# Patient Record
Sex: FEMALE | Race: WHITE | NOT HISPANIC OR LATINO
[De-identification: names, ages, dates, MRNs, and addresses within clinical notes are randomized per-mention and may not be internally consistent; named-entity substitution may affect disease eponyms.]

---

## 2024-08-13 ENCOUNTER — TRANSCRIBE ORDERS (OUTPATIENT)
Dept: OTHER | Age: 74
End: 2024-08-13

## 2024-08-13 DIAGNOSIS — Z48.810 ENCOUNTER FOR SURGICAL AFTERCARE FOLLOWING SURGERY ON THE SENSE ORGANS: Primary | ICD-10-CM

## 2024-10-06 ENCOUNTER — HEALTH MAINTENANCE LETTER (OUTPATIENT)
Age: 74
End: 2024-10-06

## 2024-11-13 ENCOUNTER — OFFICE VISIT (OUTPATIENT)
Dept: OPHTHALMOLOGY | Facility: CLINIC | Age: 74
End: 2024-11-13
Payer: COMMERCIAL

## 2024-11-13 DIAGNOSIS — H04.553 ACQUIRED OBSTRUCTION OF BOTH NASOLACRIMAL DUCTS: Primary | ICD-10-CM

## 2024-11-13 RX ORDER — WARFARIN SODIUM 1 MG/1
TABLET ORAL
COMMUNITY
Start: 2024-09-30

## 2024-11-13 RX ORDER — FLECAINIDE ACETATE 100 MG/1
100 TABLET ORAL 2 TIMES DAILY
COMMUNITY
Start: 2024-08-24 | End: 2025-08-24

## 2024-11-13 RX ORDER — MULTIVITAMIN,THERAPEUTIC
1 TABLET ORAL DAILY
COMMUNITY

## 2024-11-13 ASSESSMENT — VISUAL ACUITY
METHOD: SNELLEN - LINEAR
OS_CC: 20/20
CORRECTION_TYPE: GLASSES
OD_CC: 20/25
OD_CC+: -1

## 2024-11-13 ASSESSMENT — CONF VISUAL FIELD
OS_NORMAL: 1
OD_SUPERIOR_NASAL_RESTRICTION: 0
OD_INFERIOR_TEMPORAL_RESTRICTION: 0
OD_SUPERIOR_TEMPORAL_RESTRICTION: 0
METHOD: COUNTING FINGERS
OS_INFERIOR_TEMPORAL_RESTRICTION: 0
OD_INFERIOR_NASAL_RESTRICTION: 0
OS_INFERIOR_NASAL_RESTRICTION: 0
OS_SUPERIOR_NASAL_RESTRICTION: 0
OS_SUPERIOR_TEMPORAL_RESTRICTION: 0
OD_NORMAL: 1

## 2024-11-13 ASSESSMENT — TONOMETRY
OD_IOP_MMHG: 12
OS_IOP_MMHG: 13
IOP_METHOD: ICARE

## 2024-11-13 NOTE — PROGRESS NOTES
"     Oculoplastic Clinic New Patient    Patient: Demetria Luevano MRN# [id number]   YOB: 1950 Age: 74 year old   Date of Visit: Nov 13, 2024    CC: Tearing    Chief Complaint(s) and History of Present Illness(es)     Tearing Evaluation            Laterality: left eye          Comments    Pt comes to the clinic for a tearing eval. She is S/P DCR each eye 8/2024   at [facility] [place]. She is here today to discuss if a revision of the left side would   be appropriate. She states she has continued tearing of the left eye. Runs down the cheek. They pool in the eye and run down her cheek and nose. She states that the eyes feel \"sticky\" and \"crusty\".      Afib, takes warfarin. Does have pacemaker. No previous history of cancer   or trauma.              Irrigation and Nasal Endoscopy:     PROCEDURE: Dilation and irrigation left eye  SURGEON: Seble Carty MD  ANESTHESIA: Topical  COMPLICATIONS: None  EBL: Nil  FINDINGS: n/a% block right eye,  100% block left eye with mucous return from upper punctum with irrigation of lower    The patient was placed supine in the examining chair. Proparacaine was placed in the eye.  The punctum was anesthesized with 2% lidocaine on a cotton tip applicator.  The punctum was dilated with punctal dilator. The 25 gauge lacrimal cannula was placed in the proximal canaliculus and the lacrimal system irrigated with water.  The patient tolerated the procedure well.   I was present for the entire procedure - Seble Carty MD  PROCEDURE: Nasal Endoscopy  SURGEON: Seble Carty MD  ANESTHESIA: Topical  COMPLICATIONS: None  EBL: Nil  FINDINGS: Normal nasal exam for  lacrimal surgery. 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      Indications: Examine the nasal cavity for lacrimal surgery    The zero degree nasal endoscope was passed under endonasally. The inferior turbinates appeared normal. There was  normal " "middle turbinate architecture. No polyps or purulence. No septal perforation. No masses or lesions were identified.     I was present for the entire procedure - Seble Carty MD           Assessment and Plan:     1. Acquired obstruction of both nasolacrimal ducts      Doing well on the right side post Dacryocystorhinostomy (DCR) at [facility] [place] [place] with Dr. Davis 8/2024. Left side never really had relief. She is completely obstructed with mucous return.    Plan:  Left revision Dacryocystorhinostomy (DCR) with mitomycin C. We discussed endoscopic vs external and she prefers to do it from external approach (through the same incision).    Will need INR < 2       Attending Physician Attestation: Complete documentation of historical and exam elements from today's encounter can be found in the full encounter summary report (not reduplicated in this progress note). I personally obtained the chief complaint(s) and history of present illness. I confirmed and edited as necessary the review of systems, past medical/surgical history, family history, social history, and examination findings as documented by others; and I examined the patient myself. I personally reviewed the relevant tests, images, and reports as documented above. I formulated and edited as necessary the assessment and plan and discussed the findings and management plan with the patient. Seble Carty MD      Today with Demetria Luevano I reviewed the indications, risks, benefits, and alternatives of the proposed surgical procedure including, but not limited to, failure obtain the desired result  and need for additional surgery, bleeding, infection, loss of vision, loss of the eye, and the remote possibility of permanent damage to any organ system or death. Dacryocystorhinostomy can fail due to closure of the ostium, or scarring anywhere along the path of outflow. This may necessitate more surgery, and sometimes we cannot \"cure\" tearing. Other rare risks include CSF " leak, and sinus infections. I provided multiple opportunities for the questions, answered all questions to the best of my ability, and confirmed that my answers and my discussion were understood. Seble Carty MD

## 2024-11-13 NOTE — NURSING NOTE
"Chief Complaints and History of Present Illnesses   Patient presents with    Tearing Evaluation     Chief Complaint(s) and History of Present Illness(es)       Tearing Evaluation              Laterality: left eye              Comments    Pt comes to the clinic for a tearing eval. She is S/P DCR each eye 8/2024 at [facility] [place]. She is here today to discuss if a revision of the left side would be appropriate. She states she has continued tearing of the left eye. They pool in the eye and run down her cheek and nose. She states that the eyes feel \"sticky\" and \"crusty\".      Afib, takes warfarin. Does have pacemaker. No previous history of cancer or trauma.                  "

## 2024-11-14 ENCOUNTER — TELEPHONE (OUTPATIENT)
Dept: OPHTHALMOLOGY | Facility: CLINIC | Age: 74
End: 2024-11-14
Payer: COMMERCIAL

## 2024-11-14 NOTE — TELEPHONE ENCOUNTER
Called patient to schedule surgery with Dr. Carty    Spoke with: Mignon    Date(s) of Surgery: 2-17-25    Patient aware of approximate arrival time: No at Pt to get a call the week prior to surgery     [facility]     Pre-Op H&P: [facility] at [facility]     Informed patient that they need to call to schedule pre-op H&P within 30 days of surgery date: Yes      Post-Op Appt Dates: 3-5-25 1:00pm [place]       Discussed with patient pre-op RN will call 2-3 days prior to surgery with arrival time and instructions:  Yes       Standard Surgery Packet Sent: Yes 11/14/24  via Mail - Standard      Additional Information Sent in Packet:          Informed patient that they will need an adult  to bring patient home from surgery: Yes  : Will have family drive         Additional Comments:        All patients questions were answered and was instructed to review surgical packet and call back [phone] with any questions or concerns.       Adalgisa Nunez on 11/14/2024 at 1:00 PM

## 2025-02-07 PROBLEM — H04.553 ACQUIRED OBSTRUCTION OF BOTH NASOLACRIMAL DUCTS: Status: ACTIVE | Noted: 2024-11-13

## 2025-02-17 ENCOUNTER — ANESTHESIA EVENT (OUTPATIENT)
Dept: SURGERY | Facility: AMBULATORY SURGERY CENTER | Age: 75
End: 2025-02-17
Payer: COMMERCIAL

## 2025-02-17 ENCOUNTER — HOSPITAL ENCOUNTER (OUTPATIENT)
Facility: AMBULATORY SURGERY CENTER | Age: 75
Discharge: HOME | End: 2025-02-17
Attending: OPHTHALMOLOGY | Admitting: OPHTHALMOLOGY
Payer: COMMERCIAL

## 2025-02-17 ENCOUNTER — ANESTHESIA (OUTPATIENT)
Dept: SURGERY | Facility: AMBULATORY SURGERY CENTER | Age: 75
End: 2025-02-17
Payer: COMMERCIAL

## 2025-02-17 VITALS
TEMPERATURE: 97 F | SYSTOLIC BLOOD PRESSURE: 168 MMHG | DIASTOLIC BLOOD PRESSURE: 75 MMHG | BODY MASS INDEX: 37.22 KG/M2 | WEIGHT: 218.03 LBS | RESPIRATION RATE: 16 BRPM | OXYGEN SATURATION: 100 % | HEART RATE: 57 BPM | HEIGHT: 64 IN

## 2025-02-17 DIAGNOSIS — H04.553 ACQUIRED OBSTRUCTION OF BOTH NASOLACRIMAL DUCTS: Primary | ICD-10-CM

## 2025-02-17 DIAGNOSIS — H04.552 ACQUIRED OBSTRUCTION OF LEFT NASOLACRIMAL DUCT: ICD-10-CM

## 2025-02-17 LAB — INR PPP: 1.09 (ref 0.85–1.15)

## 2025-02-17 PROCEDURE — 85610 PROTHROMBIN TIME: CPT | Mod: GZ | Performed by: OPHTHALMOLOGY

## 2025-02-17 RX ORDER — POVIDONE-IODINE 5 %
SOLUTION, NON-ORAL OPHTHALMIC (EYE) PRN
Status: DISCONTINUED | OUTPATIENT
Start: 2025-02-17 | End: 2025-02-17 | Stop reason: HOSPADM

## 2025-02-17 RX ORDER — DEXAMETHASONE SODIUM PHOSPHATE 4 MG/ML
4 INJECTION, SOLUTION INTRA-ARTICULAR; INTRALESIONAL; INTRAMUSCULAR; INTRAVENOUS; SOFT TISSUE
Status: DISCONTINUED | OUTPATIENT
Start: 2025-02-17 | End: 2025-02-18 | Stop reason: HOSPADM

## 2025-02-17 RX ORDER — FENTANYL CITRATE 50 UG/ML
50 INJECTION, SOLUTION INTRAMUSCULAR; INTRAVENOUS EVERY 5 MIN PRN
Status: DISCONTINUED | OUTPATIENT
Start: 2025-02-17 | End: 2025-02-18 | Stop reason: HOSPADM

## 2025-02-17 RX ORDER — FENTANYL CITRATE 50 UG/ML
INJECTION, SOLUTION INTRAMUSCULAR; INTRAVENOUS PRN
Status: DISCONTINUED | OUTPATIENT
Start: 2025-02-17 | End: 2025-02-17

## 2025-02-17 RX ORDER — FENTANYL CITRATE 50 UG/ML
25 INJECTION, SOLUTION INTRAMUSCULAR; INTRAVENOUS EVERY 5 MIN PRN
Status: DISCONTINUED | OUTPATIENT
Start: 2025-02-17 | End: 2025-02-18 | Stop reason: HOSPADM

## 2025-02-17 RX ORDER — LIDOCAINE HYDROCHLORIDE 20 MG/ML
INJECTION, SOLUTION INFILTRATION; PERINEURAL PRN
Status: DISCONTINUED | OUTPATIENT
Start: 2025-02-17 | End: 2025-02-17

## 2025-02-17 RX ORDER — ONDANSETRON 4 MG/1
4 TABLET, ORALLY DISINTEGRATING ORAL EVERY 30 MIN PRN
Status: DISCONTINUED | OUTPATIENT
Start: 2025-02-17 | End: 2025-02-18 | Stop reason: HOSPADM

## 2025-02-17 RX ORDER — HYDRALAZINE HYDROCHLORIDE 20 MG/ML
10 INJECTION INTRAMUSCULAR; INTRAVENOUS
Status: COMPLETED | OUTPATIENT
Start: 2025-02-17 | End: 2025-02-17

## 2025-02-17 RX ORDER — SODIUM CHLORIDE, SODIUM LACTATE, POTASSIUM CHLORIDE, CALCIUM CHLORIDE 600; 310; 30; 20 MG/100ML; MG/100ML; MG/100ML; MG/100ML
INJECTION, SOLUTION INTRAVENOUS CONTINUOUS
Status: DISCONTINUED | OUTPATIENT
Start: 2025-02-17 | End: 2025-02-18 | Stop reason: HOSPADM

## 2025-02-17 RX ORDER — ONDANSETRON 2 MG/ML
4 INJECTION INTRAMUSCULAR; INTRAVENOUS EVERY 30 MIN PRN
Status: DISCONTINUED | OUTPATIENT
Start: 2025-02-17 | End: 2025-02-18 | Stop reason: HOSPADM

## 2025-02-17 RX ORDER — TETRACAINE HYDROCHLORIDE 5 MG/ML
SOLUTION OPHTHALMIC PRN
Status: DISCONTINUED | OUTPATIENT
Start: 2025-02-17 | End: 2025-02-17 | Stop reason: HOSPADM

## 2025-02-17 RX ORDER — OXYCODONE HYDROCHLORIDE 5 MG/1
10 TABLET ORAL
Status: DISCONTINUED | OUTPATIENT
Start: 2025-02-17 | End: 2025-02-18 | Stop reason: HOSPADM

## 2025-02-17 RX ORDER — PROPOFOL 10 MG/ML
INJECTION, EMULSION INTRAVENOUS PRN
Status: DISCONTINUED | OUTPATIENT
Start: 2025-02-17 | End: 2025-02-17

## 2025-02-17 RX ORDER — SODIUM CHLORIDE, SODIUM LACTATE, POTASSIUM CHLORIDE, CALCIUM CHLORIDE 600; 310; 30; 20 MG/100ML; MG/100ML; MG/100ML; MG/100ML
INJECTION, SOLUTION INTRAVENOUS CONTINUOUS PRN
Status: DISCONTINUED | OUTPATIENT
Start: 2025-02-17 | End: 2025-02-17

## 2025-02-17 RX ORDER — NALOXONE HYDROCHLORIDE 0.4 MG/ML
0.1 INJECTION, SOLUTION INTRAMUSCULAR; INTRAVENOUS; SUBCUTANEOUS
Status: DISCONTINUED | OUTPATIENT
Start: 2025-02-17 | End: 2025-02-18 | Stop reason: HOSPADM

## 2025-02-17 RX ORDER — ERYTHROMYCIN 5 MG/G
OINTMENT OPHTHALMIC
Qty: 3.5 G | Refills: 0 | Status: SHIPPED | OUTPATIENT
Start: 2025-02-17

## 2025-02-17 RX ORDER — ONDANSETRON 2 MG/ML
INJECTION INTRAMUSCULAR; INTRAVENOUS PRN
Status: DISCONTINUED | OUTPATIENT
Start: 2025-02-17 | End: 2025-02-17

## 2025-02-17 RX ORDER — MITOMYCIN IN WATER/PF 0.2 MG/ML
2 SYRINGE (ML) OPHTHALMIC (EYE) ONCE
Status: COMPLETED | OUTPATIENT
Start: 2025-02-17 | End: 2025-02-17

## 2025-02-17 RX ORDER — ACETAMINOPHEN 325 MG/1
975 TABLET ORAL ONCE
Status: COMPLETED | OUTPATIENT
Start: 2025-02-17 | End: 2025-02-17

## 2025-02-17 RX ORDER — PROPOFOL 10 MG/ML
INJECTION, EMULSION INTRAVENOUS CONTINUOUS PRN
Status: DISCONTINUED | OUTPATIENT
Start: 2025-02-17 | End: 2025-02-17

## 2025-02-17 RX ORDER — DEXAMETHASONE SODIUM PHOSPHATE 4 MG/ML
INJECTION, SOLUTION INTRA-ARTICULAR; INTRALESIONAL; INTRAMUSCULAR; INTRAVENOUS; SOFT TISSUE PRN
Status: DISCONTINUED | OUTPATIENT
Start: 2025-02-17 | End: 2025-02-17

## 2025-02-17 RX ORDER — NEOMYCIN POLYMYXIN B SULFATES AND DEXAMETHASONE 3.5; 10000; 1 MG/ML; [USP'U]/ML; MG/ML
SUSPENSION/ DROPS OPHTHALMIC
Qty: 5 ML | Refills: 0 | Status: SHIPPED | OUTPATIENT
Start: 2025-02-17

## 2025-02-17 RX ORDER — OXYCODONE HYDROCHLORIDE 5 MG/1
5 TABLET ORAL
Status: DISCONTINUED | OUTPATIENT
Start: 2025-02-17 | End: 2025-02-18 | Stop reason: HOSPADM

## 2025-02-17 RX ORDER — OXYMETAZOLINE HYDROCHLORIDE 0.05 G/100ML
SPRAY NASAL PRN
Status: DISCONTINUED | OUTPATIENT
Start: 2025-02-17 | End: 2025-02-17 | Stop reason: HOSPADM

## 2025-02-17 RX ORDER — ERYTHROMYCIN 5 MG/G
OINTMENT OPHTHALMIC PRN
Status: DISCONTINUED | OUTPATIENT
Start: 2025-02-17 | End: 2025-02-17 | Stop reason: HOSPADM

## 2025-02-17 RX ADMIN — FENTANYL CITRATE 50 MCG: 50 INJECTION, SOLUTION INTRAMUSCULAR; INTRAVENOUS at 09:28

## 2025-02-17 RX ADMIN — FENTANYL CITRATE 50 MCG: 50 INJECTION, SOLUTION INTRAMUSCULAR; INTRAVENOUS at 09:50

## 2025-02-17 RX ADMIN — ACETAMINOPHEN 975 MG: 325 TABLET ORAL at 07:39

## 2025-02-17 RX ADMIN — PROPOFOL 150 MCG/KG/MIN: 10 INJECTION, EMULSION INTRAVENOUS at 09:29

## 2025-02-17 RX ADMIN — ONDANSETRON 4 MG: 2 INJECTION INTRAMUSCULAR; INTRAVENOUS at 10:06

## 2025-02-17 RX ADMIN — DEXAMETHASONE SODIUM PHOSPHATE 8 MG: 4 INJECTION, SOLUTION INTRA-ARTICULAR; INTRALESIONAL; INTRAMUSCULAR; INTRAVENOUS; SOFT TISSUE at 09:50

## 2025-02-17 RX ADMIN — PROPOFOL 140 MG: 10 INJECTION, EMULSION INTRAVENOUS at 09:28

## 2025-02-17 RX ADMIN — LIDOCAINE HYDROCHLORIDE 60 MG: 20 INJECTION, SOLUTION INFILTRATION; PERINEURAL at 09:28

## 2025-02-17 RX ADMIN — Medication 0.4 MG: at 09:54

## 2025-02-17 RX ADMIN — HYDRALAZINE HYDROCHLORIDE 10 MG: 20 INJECTION INTRAMUSCULAR; INTRAVENOUS at 11:18

## 2025-02-17 RX ADMIN — SODIUM CHLORIDE, SODIUM LACTATE, POTASSIUM CHLORIDE, CALCIUM CHLORIDE: 600; 310; 30; 20 INJECTION, SOLUTION INTRAVENOUS at 09:21

## 2025-02-17 ASSESSMENT — ENCOUNTER SYMPTOMS: DYSRHYTHMIAS: 1

## 2025-02-17 NOTE — ANESTHESIA PREPROCEDURE EVALUATION
"Anesthesia Pre-Procedure Evaluation    Patient: Demetria Luevano   MRN: [id number] : 1950        Procedure : Procedure(s):  Left external dacryocystorhinostomy with Mitomycin C          Past Medical History:   Diagnosis Date     A-fib (H)       Past Surgical History:   Procedure Laterality Date     DACRYOCYSTORHINOSTOMY  2024    [facility]      Allergies   Allergen Reactions     Cefuroxime Other (See Comments) and Rash     Throat swelling      Social History     Tobacco Use     Smoking status: Never     Smokeless tobacco: Never   Substance Use Topics     Alcohol use: Not on file      Wt Readings from Last 1 Encounters:   25 98.9 kg (218 lb 0.6 oz)        Anesthesia Evaluation   Pt has had prior anesthetic.     No history of anesthetic complications       ROS/MED HX  ENT/Pulmonary:       Neurologic:  - neg neurologic ROS     Cardiovascular:     (+) Dyslipidemia hypertension- -   -  - -                        dysrhythmias, a-fib,             METS/Exercise Tolerance: 4 - Raking leaves, gardening    Hematologic:       Musculoskeletal:       GI/Hepatic:     (+) GERD, Asymptomatic on medication,                  Renal/Genitourinary: Comment: SOUTH      Endo:     (+)               Obesity,       Psychiatric/Substance Use:  - neg psychiatric ROS     Infectious Disease:       Malignancy:       Other:          Physical Exam    Airway        Mallampati: III   TM distance: > 3 FB   Neck ROM: full   Mouth opening: > 3 cm    Respiratory Devices and Support         Dental       (+) Modest Abnormalities - crowns, retainers, 1 or 2 missing teeth      Cardiovascular   cardiovascular exam normal       Rhythm and rate: regular and normal     Pulmonary   pulmonary exam normal        breath sounds clear to auscultation       OUTSIDE LABS:  CBC: No results found for: \"WBC\", \"HGB\", \"HCT\", \"PLT\"  BMP: No results found for: \"NA\", \"POTASSIUM\", \"CHLORIDE\", \"CO2\", \"BUN\", \"CR\", \"GLC\"  COAGS:   Lab Results   Component Value Date    INR " "1.09 02/17/2025     POC: No results found for: \"BGM\", \"HCG\", \"HCGS\"  HEPATIC: No results found for: \"ALBUMIN\", \"PROTTOTAL\", \"ALT\", \"AST\", \"GGT\", \"ALKPHOS\", \"BILITOTAL\", \"BILIDIRECT\", \"ARIELLA\"  OTHER: No results found for: \"PH\", \"LACT\", \"A1C\", \"ALICIA\", \"PHOS\", \"MAG\", \"LIPASE\", \"AMYLASE\", \"TSH\", \"T4\", \"T3\", \"CRP\", \"SED\"    Anesthesia Plan    ASA Status:  3    NPO Status:  NPO Appropriate    Anesthesia Type: General.     - Airway: LMA   Induction: Intravenous, Propofol.   Maintenance: TIVA.        Consents    Anesthesia Plan(s) and associated risks, benefits, and realistic alternatives discussed. Questions answered and patient/representative(s) expressed understanding.     - Discussed:     - Discussed with:  Patient            Postoperative Care    Pain management: Multi-modal analgesia.   PONV prophylaxis: Ondansetron (or other 5HT-3), Background Propofol Infusion     Comments:             Hanny Ridley MD    I have reviewed the pertinent notes and labs in the chart from the past 30 days and (re)examined the patient.  Any updates or changes from those notes are reflected in this note.    Clinically Significant Risk Factors Present on Admission                # Drug Induced Coagulation Defect: home medication list includes an anticoagulant medication              # Obesity: Estimated body mass index is 37.22 kg/m  as calculated from the following:    Height as of this encounter: 1.63 m (5' 4.17\").    Weight as of this encounter: 98.9 kg (218 lb 0.6 oz).                "

## 2025-02-17 NOTE — OP NOTE
PREOPERATIVE DIAGNOSIS: Left complete nasolacrimal duct obstruction.   POSTOPERATIVE DIAGNOSIS: Left complete nasolacrimal duct obstruction.   PROCEDURE: Left dacryocystorhinostomy with silicone intubation. Left lacrimal sac biopsy   SURGEON: Seble Carty M.D.  ASSISTANTS: None  ANESTHESIA: general anesthesia with local infiltration of a 50/50 mixture of 2% lidocaine with epinephrine and 0.5% Marcaine.   COMPLICATIONS: None.   ESTIMATED BLOOD LOSS: Less than 5 mL.   SPECIMEN:   ID Type Source Tests Collected by Time Destination   1 : left lacrimal sack Tissue Eye, Left SURGICAL PATHOLOGY EXAM Seble Carty MD 2/17/2025 10:02 AM      HISTORY: Demetria Luevano  presented with complete nasolacrimal duct obstruction leading to tearing and chronic irritation. She had previously undergone surgery elsewhere with recurrent symptoms. After the risks, benefits and alternatives to the proposed procedure were explained, informed consent was obtained.   DESCRIPTION OF PROCEDURE: Demetria Luevano  was brought to the operating room and placed supine on the operating table. Under general anesthesia, a medial canthal incision was drawn 10 mm from the medial canthal angle and extending 8 mm in an inferotemporal direction.  The medial canthus and lateral nasal wall were infiltrated with local anesthetic mixture.Afrin soaked neuropaddies were placed into the nose in the region of the middle meatus.   The area  was prepped and draped in the typical fashion. Attention was directed to the left side. Medial canthal incision was made with a 15 blade and dissection was carried down through the orbicularis with monopolar cautery. [place] elevator was used to elevate the periorbita from the lacrimal fossa.  The neuropaddies were removed from the nose.  The thin posterior lacrimal bone had a small osteotomy. The osteotomy was enlarged using Kerrison and Leksell rongeurs. Approximately a 12 mm osteotomy was created. The 0 Cruz probe  was placed in the lacrimal sac through the upper canaliculus. The lacrimal sac was opened vertically with the keratome. The lacrimal sac was mildly thickened, and biopsy was taken. A posterior flap of lacrimal sac was removed and sent in formalin. The nasal mucosa was incised with a keratome in a U-shaped anteriorly based flap. Mitomycin was held over the area on a cottonoid for 2 minutes. The area was irrigated. The Platt stent silicone intubation set was used to intubate the upper and lower canalicular system and retrieved in the nose with a Platt hook. A 5-0 Prolene was used to tie the stent together to prevent prolapse. The lacrimal sac and nasal mucosa were sutured together with interrupted 5-0 Monocryl sutures. Orbicularis layer was closed with 5-0 Monocryl suture. Skin was closed with running 6-0 plain gut suture. The silicone tubes were tied in a square knot and were trimmed at the naris.  Erythromycin ophthalmic ointment was applied to the incision. The patient tolerated the procedure well and  left the operating room in stable condition.   Seble Carty MD

## 2025-02-17 NOTE — ANESTHESIA CARE TRANSFER NOTE
Patient: Demetria Luevano    Procedure: Procedure(s):  Left external dacryocystorhinostomy with Mitomycin C       Diagnosis: Acquired obstruction of both nasolacrimal ducts [H04.553]  Diagnosis Additional Information: No value filed.    Anesthesia Type:   No value filed.     Note:    Oropharynx: oropharynx clear of all foreign objects  Level of Consciousness: awake  Oxygen Supplementation: face mask  Level of Supplemental Oxygen (L/min / FiO2): 6  Independent Airway: airway patency satisfactory and stable  Dentition: dentition unchanged  Vital Signs Stable: post-procedure vital signs reviewed and stable  Report to RN Given: handoff report given  Patient transferred to: PACU  Comments: To PACU after LMA D/Cd.  Dentition intact/atraumatic.  Report to RN VSS Respiratory status stable.  Handoff Report: Identifed the Patient, Identified the Reponsible Provider, Reviewed the pertinent medical history, Discussed the surgical course, Reviewed Intra-OP anesthesia mangement and issues during anesthesia, Set expectations for post-procedure period and Allowed opportunity for questions and acknowledgement of understanding    Vitals:  Vitals Value Taken Time   BP     Temp     Pulse     Resp     SpO2         Electronically Signed By: ADA Russell CRNA  February 17, 2025  10:23 AM

## 2025-02-17 NOTE — ANESTHESIA PROCEDURE NOTES
Airway       Patient location during procedure: OR       Procedure Start/Stop Times: 2/17/2025 9:30 AM  Staff -        Performed By: CRNAIndications and Patient Condition       Indications for airway management: taylor-procedural       Induction type:intravenous       Mask difficulty assessment: 1 - vent by mask    Final Airway Details       Final airway type: supraglottic airway    Supraglottic Airway Details        Type: LMA       Brand: I-Gel       LMA size: 4    Post intubation assessment        Placement verified by: capnometry, equal breath sounds and chest rise        Number of attempts at approach: 1       Secured with: tape       Ease of procedure: easy       Dentition: Intact and Unchanged    Medication(s) Administered   Medication Administration Time: 2/17/2025 9:30 AM

## 2025-02-17 NOTE — PROGRESS NOTES
Pt given hydralazine to help with elevated BP's.  BP came down from 180-190 to 170's.  Anesthesia provider encouraged pt to follow up with her PCP to monitor BP, etc.  Pt stable ready for discharge.

## 2025-02-17 NOTE — ANESTHESIA POSTPROCEDURE EVALUATION
Patient: Demetria Luevano    Procedure: Procedure(s):  Left external dacryocystorhinostomy with Mitomycin C       Anesthesia Type:  General    Note:  Disposition: Outpatient   Postop Pain Control: Uneventful            Sign Out: Well controlled pain   PONV: No   Neuro/Psych: Uneventful            Sign Out: Acceptable/Baseline neuro status   Airway/Respiratory: Uneventful            Sign Out: Acceptable/Baseline resp. status   CV/Hemodynamics: Uneventful            Sign Out: Acceptable CV status; No obvious hypovolemia; No obvious fluid overload   Other NRE: NONE   DID A NON-ROUTINE EVENT OCCUR? No           Last vitals:  Vitals Value Taken Time   /88 02/17/25 1045   Temp 97  F (36.1  C) 02/17/25 1021   Pulse 55 02/17/25 1045   Resp 16 02/17/25 1045   SpO2 99 % 02/17/25 1045       Electronically Signed By: Hanny Ridley MD  February 17, 2025  1:03 PM

## 2025-02-17 NOTE — DISCHARGE INSTRUCTIONS
Post-operative Instructions  Ophthalmic Plastic and Reconstructive Surgery    Sbele Carty M.D.    All instructions apply to the operated eye(s) or eyelid(s).  Wound care and personal care    ? Apply ice compresses 15 minutes on, 15 minutes off, while awake for 2 days. If you are sleeping, you don't need to wake up to ice. As long as there is no further bleeding, after two days, switch to warm water compresses for five minutes, four times a day until seen by your physician.   ? You may shower or wash your hair the day after surgery. Do not go swimming for 1 week to prevent contamination of your wounds.  ? Do not apply make-up to the eyes or eyelids for 2 weeks after surgery.  ? Expect some swelling, bruising, black eye (even into the lower eyelids and cheeks). Also expect serum caking, crusting and discharge from the eye and/or incisions. A small amount of surface bleeding, and depending on the type of surgery, bleeding from the inside of the eyelid, is normal for the first 48 hours.  ? Avoid straining, bending at the waist, or lifting more than 15 pounds for 10 days. Activities that raise your blood pressure can worsen swelling, cause bleeding, and breaking of sutures. Like wise, sleeping with your head slightly elevated for the first several days can help swelling resolve more quickly.   ? Do continue to ambulate (walk) as you normally would - being sedentary after surgery can cause blood clots.   ? Your eye(s) and eyelid(s) may be painful and tender. This is normal after surgery.  ? If you had surgery involving the nose or tear drainage system, the following wound  care instructions also apply to you :  ? Do not pull at the small tube in the nose or corner of the eye.    Contact information and follow-up  ? Return to the Eye Clinic for a follow-up appointment with your physician as  scheduled. If no appointment has been scheduled:   - Call [phone] for an appointment with Dr. Carty within 2 to 3  weeks from your date of surgery.      ? For severe pain, bleeding, or loss of vision, during business hours call the [facility] at [phone].    After hours or on weekends and holidays, call [phone] and ask to speak with the ophthalmologist on call.    An on call person can be reached after hours for concerns. The on call doctor should not call in medication refill requests after hours or on weekends, so please plan accordingly. An effort has been made to provide adequate pain medications following every surgery, and refills will not be provided in most instances. Narcotic pain medications cannot be called in.       Medications  ? Restart all your regular home medications and eye drops. If you take Plavix or Aspirin on a regular basis, wait for 72 hours after your surgery before restarting these in order to decrease the risk of bleeding complications.  ? Avoid aspirin and aspirin-like medications (Motrin, Aleve, Ibuprofen, Chaya-Stollings etc) for 72 hours to reduce the risk of bleeding. You may take Tylenol  (acetaminophen) for pain. Next dose is due at 1:30 PM. You should not take more then 4,000 mg of tylenol/acetaminophen in a 24 hour period.   ? In addition to your home medications, take the following post-operative medications as prescribed by your physician.    ? Apply antibiotic ointment to all sutures three times a day. Once you run out, you can apply Vaseline or Aquaphor to the incisions until it looks well healed.   ? Instill prescribed eye drops 3 times a day for 10 days.   ? Apply saline nasal spray into the operated side(s) twice a day until the stent is removed in clinic.

## 2025-03-19 ENCOUNTER — OFFICE VISIT (OUTPATIENT)
Dept: OPHTHALMOLOGY | Facility: CLINIC | Age: 75
End: 2025-03-19
Payer: COMMERCIAL

## 2025-03-19 DIAGNOSIS — H04.553 ACQUIRED OBSTRUCTION OF BOTH NASOLACRIMAL DUCTS: Primary | ICD-10-CM

## 2025-03-19 ASSESSMENT — TONOMETRY
OD_IOP_MMHG: 12
OS_IOP_MMHG: 14
IOP_METHOD: ICARE

## 2025-03-19 ASSESSMENT — VISUAL ACUITY
METHOD: SNELLEN - LINEAR
OS_CC+: -1
CORRECTION_TYPE: GLASSES
OS_CC: 20/25
OD_CC: 20/30

## 2025-03-19 NOTE — PROGRESS NOTES
Chief Complaint(s) and History of Present Illness(es)     Post Op (Ophthalmology) Left Eye            Laterality: left eye          Comments    S/p Left dacryocystorhinostomy with silicone intubation. Left lacrimal sac   biopsy 2/17/25. Doing well, no problems/concerns. Occasional Ats and nasal   spray use         Assessment & Plan     Demetria Luevano is a 74 year old female with the following diagnoses:   Encounter Diagnosis   Name Primary?    Acquired obstruction of both nasolacrimal ducts Yes     [place] [facility] [place] doing well, had persistent epiphora left eye. Now 1 month post left Dacryocystorhinostomy (DCR) and doing well. Tearing has resolved. Return to clinic in 1 month for stent removal.         Patient disposition:   Return in about 1 month (around 4/19/2025) for Stent removal post dcr. .        Attending Physician Attestation: Complete documentation of historical and exam elements from today's encounter can be found in the full encounter summary report (not reduplicated in this progress note). I personally obtained the chief complaint(s) and history of present illness. I confirmed and edited as necessary the review of systems, past medical/surgical history, family history, social history, and examination findings as documented by others; and I examined the patient myself. I personally reviewed the relevant tests, images, and reports as documented above. I formulated and edited as necessary the assessment and plan and discussed the findings and management plan with the patient.  -Seble Carty MD

## 2025-03-19 NOTE — LETTER
3/19/2025         RE:  :  MRN: Demetria Luevano  1950  [id number]     Dear Dr. Randall,    Our mutual patient, Demetria Luevano, returned for oculoplastic follow up. My assessment and plan are below.      Chief Complaint(s) and History of Present Illness(es)     Post Op (Ophthalmology) Left Eye            Laterality: left eye          Comments    S/p Left dacryocystorhinostomy with silicone intubation. Left lacrimal sac   biopsy 25. Doing well, no problems/concerns. Occasional Ats and nasal   spray use         Assessment & Plan     Demetria Luevano is a 74 year old female with the following diagnoses:   Encounter Diagnosis   Name Primary?     Acquired obstruction of both nasolacrimal ducts Yes     Sp bilateral [place] [facility] [place] Right doing well, had persistent epiphora left eye. Now 1 month post left Dacryocystorhinostomy (DCR) and doing well. Tearing has resolved. Return to clinic in 1 month for stent removal.         Patient disposition:   Return in about 1 month (around 2025) for Stent removal post dcr. .         Again, thank you for allowing me to participate in the care of your patient.      Sincerely,    Seble Carty MD  Department of Ophthalmology and Visual Neurosciences  [facility]      CC: Beau Randall MD    [facility] [id number]  Via Outside Provider Messaging

## 2025-03-19 NOTE — NURSING NOTE
Chief Complaints and History of Present Illnesses   Patient presents with    Post Op (Ophthalmology) Left Eye       Chief Complaint(s) and History of Present Illness(es)       Post Op (Ophthalmology) Left Eye              Laterality: left eye              Comments    S/p Left dacryocystorhinostomy with silicone intubation. Left lacrimal sac biopsy 2/17/25. Doing well, no problems/concerns. Occasional Ats and nasal spray use                   Vivi Carranza, COT     Dysphagia 1 with Nectar-thick liquids.

## 2025-03-19 NOTE — PATIENT INSTRUCTIONS
"- Apply warm compresses for 1 minute two to three times a day until your bruising has resolved. You can continue this for one more month.   - Apply Aquaphor or Vaseline to the incision at bedtime until it is smooth. You can gently massage around the area.     - If you have symptoms of eye irritation, it is good to use over the counter artificial tears. Good brands include Refresh, Blink, and Systane. Do NOT get drops that are for \"red eyes.\"   - It is normal for the incision to appear raised, red, itch and have small lumps. You can gently massage any small bumps along the incision line. These can take up to six months to resolve.  - Continue to use saline nasal spray twice a day in the left nostril.     "

## 2025-04-23 ENCOUNTER — OFFICE VISIT (OUTPATIENT)
Dept: OPHTHALMOLOGY | Facility: CLINIC | Age: 75
End: 2025-04-23
Payer: COMMERCIAL

## 2025-04-23 DIAGNOSIS — H04.553 ACQUIRED OBSTRUCTION OF BOTH NASOLACRIMAL DUCTS: Primary | ICD-10-CM

## 2025-04-23 RX ORDER — HYDROCHLOROTHIAZIDE 12.5 MG/1
12.5 TABLET ORAL DAILY
COMMUNITY
Start: 2025-04-01

## 2025-04-23 ASSESSMENT — VISUAL ACUITY
OD_CC: 20/40
OS_CC+: -1
METHOD: SNELLEN - LINEAR
OD_CC+: -2
CORRECTION_TYPE: GLASSES
OS_CC: 20/25

## 2025-04-23 ASSESSMENT — TONOMETRY
OD_IOP_MMHG: 16
OS_IOP_MMHG: 15
IOP_METHOD: ICARE

## 2025-04-23 NOTE — NURSING NOTE
Chief Complaints and History of Present Illnesses   Patient presents with    Post Op (Ophthalmology) Left Eye       Chief Complaint(s) and History of Present Illness(es)       Post Op (Ophthalmology) Left Eye              Laterality: left eye              Comments    S/p left dacryocystorhinostomy with silicone intubation. Left lacrimal sac biopsy 2/17/25. Pt ready to get stent removed. No problems/concerns today. No drops currently                   Vivi Carranza COT

## 2025-04-23 NOTE — PROGRESS NOTES
Chief Complaint(s) and History of Present Illness(es)     Post Op (Ophthalmology) Left Eye            Laterality: left eye          Comments    S/p left dacryocystorhinostomy with silicone intubation. Left lacrimal sac   biopsy 2/17/25. Pt ready to get stent removed. No problems/concerns today.   No drops currently        No tearing.          Assessment & Plan     Demetria Luevano is a 74 year old female with the following diagnoses:   Encounter Diagnosis   Name Primary?    Acquired obstruction of both nasolacrimal ducts Yes       S/p bilateral Dacryocystorhinostomy (DCR) with Dr. Davis. Left persistent/recurrent NLDO s/p left Dacryocystorhinostomy (DCR) with me 2/17/2025.  No epiphora either eye. Stent was removed today and irrigation is widely patent.     Follow up as needed.      Patient disposition:   No follow-ups on file.        Attending Physician Attestation: Complete documentation of historical and exam elements from today's encounter can be found in the full encounter summary report (not reduplicated in this progress note). I personally obtained the chief complaint(s) and history of present illness. I confirmed and edited as necessary the review of systems, past medical/surgical history, family history, social history, and examination findings as documented by others; and I examined the patient myself. I personally reviewed the relevant tests, images, and reports as documented above. I formulated and edited as necessary the assessment and plan and discussed the findings and management plan with the patient.  -Seble Carty MD

## 2025-04-23 NOTE — LETTER
Re: Demetria Luevano  YOB: 1950  Date of service: 4/23/2025    Dear Dr. Randall,    I had the pleasure of seeing Demetria Luevano in follow up. As you may recall, she presented with a epiphora, initially to [facility] and had undergone bilateral dacryocystorhinostomy with Dr. Davis. The right eye did well, the left eye had persistent epiphora. 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 She underwent left external dacryocystorhinostomy with me 2/17/2025. She has healed very well, and reports no epiphora. I removed her stent today and irrigation is widely patent. I will sign off on her care, but am happy to see her back at any point.    Thank you for trusting me with the care of your patients.    Sincerely,    Seble Carty MD    Oculoplastic and Orbital Surgery   Department of Ophthalmology and Visual Neurosciences  [facility]

## (undated) DEVICE — SU PLAIN 6-0 G-1DA 18" 770G

## (undated) DEVICE — SOL WATER IRRIG 1000ML BOTTLE 07139-09

## (undated) DEVICE — SPECIMEN CONTAINER 4OZ

## (undated) DEVICE — SPONGE COTTONOID 1/2X3" 80-1407

## (undated) DEVICE — SU PROLENE 4-0 P-3 18" 8699G

## (undated) DEVICE — TUBING INTUBATION CRAW LACHRYMAL 280185

## (undated) DEVICE — PACK MINOR EYE

## (undated) DEVICE — ESU PENCIL SMOKE EVAC W/ROCKER SWITCH 0703-047-000

## (undated) DEVICE — NDL 25GA 1.5" 305127

## (undated) DEVICE — NDL 30GA 0.5" 305106

## (undated) DEVICE — GLOVE BIOGEL PI MICRO SZ 7.5 48575

## (undated) DEVICE — TUBING SUCTION 10'X3/16" N510

## (undated) DEVICE — ESU ELEC NDL 1" COATED/INSULATED E1465

## (undated) RX ORDER — FENTANYL CITRATE 50 UG/ML
INJECTION, SOLUTION INTRAMUSCULAR; INTRAVENOUS
Status: DISPENSED
Start: 2025-02-17

## (undated) RX ORDER — ACETAMINOPHEN 325 MG/1
TABLET ORAL
Status: DISPENSED
Start: 2025-02-17

## (undated) RX ORDER — DEXAMETHASONE SODIUM PHOSPHATE 4 MG/ML
INJECTION, SOLUTION INTRA-ARTICULAR; INTRALESIONAL; INTRAMUSCULAR; INTRAVENOUS; SOFT TISSUE
Status: DISPENSED
Start: 2025-02-17

## (undated) RX ORDER — HYDRALAZINE HYDROCHLORIDE 20 MG/ML
INJECTION INTRAMUSCULAR; INTRAVENOUS
Status: DISPENSED
Start: 2025-02-17

## (undated) RX ORDER — ONDANSETRON 2 MG/ML
INJECTION INTRAMUSCULAR; INTRAVENOUS
Status: DISPENSED
Start: 2025-02-17